# Patient Record
Sex: FEMALE
[De-identification: names, ages, dates, MRNs, and addresses within clinical notes are randomized per-mention and may not be internally consistent; named-entity substitution may affect disease eponyms.]

---

## 2020-11-19 ENCOUNTER — NURSE TRIAGE (OUTPATIENT)
Dept: OTHER | Facility: CLINIC | Age: 55
End: 2020-11-19

## 2020-11-19 NOTE — TELEPHONE ENCOUNTER
2 weeks ago hit under left side of ribs. Hurting for awhile. Still aching. Pain 3/10. Comes and goes. Reason for Disposition   Injury and pain has not improved after 3 days    Answer Assessment - Initial Assessment Questions  1. MECHANISM: \"How did the injury happen? \"        Hit by dog    2. ONSET: \"When did the injury happen? \" (Minutes or hours ago)        See above    3. LOCATION: \"Where on the chest is the injury located? \"        See above    4. APPEARANCE: \"What does the injury look like? \"        Looks normal    5. BLEEDING: \"Is there any bleeding now? If so, ask: How long has it been bleeding? \"        No    6. SEVERITY: Terrilee Cam difficulty with breathing? \"        No    7. SIZE: For cuts, bruises, or swelling, ask: \"How large is it? \" (e.g., inches or centimeters)        None    8. PAIN: \"Is there pain? \" If so, ask: \"How bad is the pain? \"   (e.g., Scale 1-10; or mild, moderate, severe)        See above    9. TETANUS: For any breaks in the skin, ask: \"When was the last tetanus booster? \"        NA     10. PREGNANCY: \"Is there any chance you are pregnant? \" \"When was your last menstrual period? \"          no    Protocols used: CHEST INJURY-ADULT-[place]    Caller provided care advice and instructed to call back with worsening symptoms. Attention Provider: Thank you for allowing me to participate in the care of your patient. The patient was connected to triage in response to information provided to the [facility]. Please do not respond through this encounter as the response is not directed to a shared pool.

## 2020-11-25 ENCOUNTER — NURSE TRIAGE (OUTPATIENT)
Dept: OTHER | Facility: CLINIC | Age: 55
End: 2020-11-25

## 2020-11-25 NOTE — TELEPHONE ENCOUNTER
Hit on left side about a week ago. Under rib cage. Face is feeling numb. Started in nose. Has headache      Reason for Disposition   Headache (with neurologic deficit)    Answer Assessment - Initial Assessment Questions  1. SYMPTOM: \"What is the main symptom you are concerned about? \" (e.g., weakness, numbness)        Numbness to face    2. ONSET: \"When did this start? \" (minutes, hours, days; while sleeping)        Today- 0700am    3. LAST NORMAL: \"When was the last time you were normal (no symptoms)? \"        Yesterday when went to bed      4. PATTERN \"Does this come and go, or has it been constant since it started? \"  \"Is it present now? \"        constant    5. CARDIAC SYMPTOMS: \"Have you had any of the following symptoms: chest pain, difficulty breathing, palpitations? \"        No    6. NEUROLOGIC SYMPTOMS: \"Have you had any of the following symptoms: headache, dizziness, vision loss, double vision, changes in speech, unsteady on your feet? \"        Double vision    7. OTHER SYMPTOMS: \"Do you have any other symptoms? \"        No    8. PREGNANCY: \"Is there any chance you are pregnant? \" \"When was your last menstrual period? \"        No    Protocols used: NEUROLOGIC DEFICIT-ADULT-[place]    Does have someone driving her to ED. Caller provided care advice and instructed to call back with worsening symptoms. Attention Provider: Thank you for allowing me to participate in the care of your patient. The patient was connected to triage in response to information provided to the [facility]. Please do not respond through this encounter as the response is not directed to a shared pool.